# Patient Record
Sex: FEMALE
[De-identification: names, ages, dates, MRNs, and addresses within clinical notes are randomized per-mention and may not be internally consistent; named-entity substitution may affect disease eponyms.]

---

## 2024-01-16 ENCOUNTER — APPOINTMENT (OUTPATIENT)
Dept: SURGERY | Facility: CLINIC | Age: 29
End: 2024-01-16
Payer: MEDICAID

## 2024-01-16 VITALS
DIASTOLIC BLOOD PRESSURE: 73 MMHG | BODY MASS INDEX: 32.43 KG/M2 | WEIGHT: 183 LBS | HEART RATE: 85 BPM | HEIGHT: 63 IN | SYSTOLIC BLOOD PRESSURE: 113 MMHG

## 2024-01-16 DIAGNOSIS — Z78.9 OTHER SPECIFIED HEALTH STATUS: ICD-10-CM

## 2024-01-16 DIAGNOSIS — K60.2 ANAL FISSURE, UNSPECIFIED: ICD-10-CM

## 2024-01-16 DIAGNOSIS — Z83.79 FAMILY HISTORY OF OTHER DISEASES OF THE DIGESTIVE SYSTEM: ICD-10-CM

## 2024-01-16 DIAGNOSIS — K59.00 CONSTIPATION, UNSPECIFIED: ICD-10-CM

## 2024-01-16 DIAGNOSIS — Z56.0 UNEMPLOYMENT, UNSPECIFIED: ICD-10-CM

## 2024-01-16 PROBLEM — Z00.00 ENCOUNTER FOR PREVENTIVE HEALTH EXAMINATION: Status: ACTIVE | Noted: 2024-01-16

## 2024-01-16 PROCEDURE — 99203 OFFICE O/P NEW LOW 30 MIN: CPT

## 2024-01-16 RX ORDER — HYDROCORTISONE 25 MG/G
2.5 CREAM TOPICAL
Qty: 30 | Refills: 1 | Status: ACTIVE | COMMUNITY
Start: 2024-01-16 | End: 1900-01-01

## 2024-01-16 RX ORDER — LIDOCAINE 5 G/100G
5 OINTMENT TOPICAL
Qty: 50 | Refills: 1 | Status: ACTIVE | COMMUNITY
Start: 2024-01-16 | End: 1900-01-01

## 2024-01-16 SDOH — ECONOMIC STABILITY - INCOME SECURITY: UNEMPLOYMENT, UNSPECIFIED: Z56.0

## 2024-01-17 NOTE — PLAN
[TextEntry] : -Explained that I think Ms. RAJINDER ELKINS has developed an anal fissure. Discussed pathophysiology of anal fissures with Ms. RAJINDER ELKINS and gave her a copy of the anal fissure protocol. Reviewed the protocol and highlighted fiber supplement, drinking fluids and sitz baths. - Reviewed internal hemorrhoids and external hemorrhoids as well as their difference. Discussed surgical hemorrhoidectomy and explained that is has a very painful postop course. Recommend we try to treat the fissure first to see if that helps with the anal pain and bleeding before considering hemorrhoidectomy - Prescribed nifedipine for 6wks, hydrocortisone (instructed to use for 7 days at a time and to give it a break in between uses to prevent skin thinning) and lidocaine. - RTC in 4 wks (or told to call sooner if no improvement at all after 2 wks)

## 2024-01-17 NOTE — PHYSICAL EXAM
[Normal Breath Sounds] : Normal breath sounds [Normal Heart Sounds] : normal heart sounds [Normal Rate and Rhythm] : normal rate and rhythm [Alert] : alert [Oriented to Person] : oriented to person [Oriented to Place] : oriented to place [Oriented to Time] : oriented to time [Normal] : was normal [Posterior] : posteriorly [Excoriation] : no perianal excoriation [Tender, Swollen] : nontender, non-swollen [Thrombosed] : that was not thrombosed [JVD] : no jugular venous distention  [Wheezing] : no wheezing was heard [de-identified] : soft, non-distended, non-tender to palpation.  [de-identified] : Large RA, medium LA, small LP non-inflamed external hemorrhoids. PM pain on ARSH. Internal Exam deferred due to pain [de-identified] : Large RA, medium LA, small LP external hemorrhoids [de-identified] : awake, alert, NAD  [de-identified] : normocephalic, atraumatic, EOMI, normal conjunctiva  [de-identified] : b/l chest rise, EWOB on RA  [de-identified] : deferred [de-identified] : Normal strength  [de-identified] : Perianal as above  [de-identified] : normal mood and affect

## 2024-01-17 NOTE — ASSESSMENT
[FreeTextEntry1] : Ms. RAJINDER ELKINS is a 28 year F with a history of chronic constipation presenting with anal pain and bleeding most likely due to anal fissure.

## 2024-01-17 NOTE — REVIEW OF SYSTEMS
[As Noted in HPI] : as noted in HPI [Constipation] : constipation [Negative] : Neurological [Fever] : no fever [Chills] : no chills [Feeling Poorly] : not feeling poorly [Feeling Tired] : not feeling tired [Recent Weight Loss (___ Lbs)] : no recent weight loss [Eye Pain] : no eye pain [Earache] : no earache [Chest Pain] : no chest pain [Shortness Of Breath] : no shortness of breath [Abdominal Pain] : no abdominal pain [Vomiting] : no vomiting [Diarrhea] : no diarrhea [Dysuria] : no dysuria [FreeTextEntry7] : anal pain and bleeding

## 2024-01-17 NOTE — HISTORY OF PRESENT ILLNESS
[FreeTextEntry1] : Ms. RAJINDER ELKINS is a 28 year F with a history of chronic constipation presenting with anal pain and bleeding here for an initial visit. She first developed hemorrhoids about 1yr ago while she was pregnant. They have gotten worse recently because they cause anal pain, burning, itching and bleeding. She describes an external pinching and burning pain during BMs which lasts for hours afterwards. Currently taking a stool softener BID and miralax prn, which helps with constipation and leads to BMs every other day. Blood noted with wiping and in the toilet after BMs. Without OTC medication, she has hard BMs twice a week with straining. Previously prescribed hydrocortisone cream which didn't help. Denies taking fiber supplement. constipation causes back and abdominal pain. Denies nausea, vomiting, dysuria, unintentional weight loss, fevers/chills. She has occasional back and abdominal pain which happens when she is constipated and having to strain. Has never had a colonoscopy.